# Patient Record
Sex: FEMALE | Race: WHITE | Employment: FULL TIME | URBAN - METROPOLITAN AREA
[De-identification: names, ages, dates, MRNs, and addresses within clinical notes are randomized per-mention and may not be internally consistent; named-entity substitution may affect disease eponyms.]

---

## 2024-03-21 ENCOUNTER — TELEPHONE (OUTPATIENT)
Age: 44
End: 2024-03-21

## 2024-05-06 ENCOUNTER — OFFICE VISIT (OUTPATIENT)
Dept: ENDOCRINOLOGY | Facility: HOSPITAL | Age: 44
End: 2024-05-06
Payer: COMMERCIAL

## 2024-05-06 VITALS
WEIGHT: 187.2 LBS | DIASTOLIC BLOOD PRESSURE: 70 MMHG | SYSTOLIC BLOOD PRESSURE: 104 MMHG | HEART RATE: 120 BPM | HEIGHT: 64 IN | BODY MASS INDEX: 31.96 KG/M2

## 2024-05-06 DIAGNOSIS — R53.82 CHRONIC FATIGUE: Primary | ICD-10-CM

## 2024-05-06 DIAGNOSIS — Z83.49 FAMILY HISTORY OF THYROID DISEASE: ICD-10-CM

## 2024-05-06 DIAGNOSIS — E83.52 HIGH CALCIUM LEVELS: ICD-10-CM

## 2024-05-06 DIAGNOSIS — L65.9 HAIR LOSS: ICD-10-CM

## 2024-05-06 DIAGNOSIS — Z90.89 STATUS POST PARATHYROIDECTOMY: ICD-10-CM

## 2024-05-06 DIAGNOSIS — Z98.890 STATUS POST PARATHYROIDECTOMY: ICD-10-CM

## 2024-05-06 DIAGNOSIS — Z86.39 HISTORY OF PARATHYROID DISEASE: ICD-10-CM

## 2024-05-06 DIAGNOSIS — Z83.2 FAMILY HISTORY OF AUTOIMMUNE DISORDER: ICD-10-CM

## 2024-05-06 PROCEDURE — 99205 OFFICE O/P NEW HI 60 MIN: CPT | Performed by: INTERNAL MEDICINE

## 2024-05-06 RX ORDER — AZELASTINE HYDROCHLORIDE 137 UG/1
SPRAY, METERED NASAL
COMMUNITY

## 2024-05-06 RX ORDER — ATOMOXETINE 80 MG/1
80 CAPSULE ORAL DAILY
COMMUNITY

## 2024-05-06 RX ORDER — FERROUS SULFATE 325(65) MG
325 TABLET ORAL
COMMUNITY

## 2024-05-06 RX ORDER — ALPRAZOLAM 1 MG
1 TABLET ORAL AS NEEDED
COMMUNITY

## 2024-05-06 RX ORDER — LOSARTAN POTASSIUM 50 MG/1
50 TABLET ORAL DAILY
COMMUNITY

## 2024-05-06 RX ORDER — HYDROCORTISONE ACETATE 25 MG/1
SUPPOSITORY RECTAL
COMMUNITY
Start: 2024-03-14

## 2024-05-06 RX ORDER — LAMOTRIGINE 25 MG/1
50 TABLET ORAL DAILY
COMMUNITY

## 2024-05-06 NOTE — PROGRESS NOTES
5/6/2024    Assessment/Plan      Diagnoses and all orders for this visit:    Chronic fatigue  -     T4, free; Future  -     Thyroid Peroxidase and Thyroglobulin Antibodies; Future  -     TSH, 3rd generation; Future  -     Vitamin D 25 hydroxy  -     T3, free  -     Comprehensive metabolic panel; Future  -     CBC and differential  -     Ferritin  -     Magnesium  -     Phosphorus  -     PTH, intact  -     Calcium, ionized; Future  -     NILSA SCR, IFA W/REFL TITER/ PATTERN/MCT PNL 1; Future  -     Cortisol Level,7-9 AM Specimen    High calcium levels  -     T4, free; Future  -     Thyroid Peroxidase and Thyroglobulin Antibodies; Future  -     TSH, 3rd generation; Future  -     Vitamin D 25 hydroxy  -     T3, free  -     Comprehensive metabolic panel; Future  -     CBC and differential  -     Ferritin  -     Magnesium  -     Phosphorus  -     PTH, intact  -     Calcium, ionized; Future  -     NILSA SCR, IFA W/REFL TITER/ PATTERN/MCT PNL 1; Future  -     Cortisol Level,7-9 AM Specimen    History of parathyroid disease  -     T4, free; Future  -     Thyroid Peroxidase and Thyroglobulin Antibodies; Future  -     TSH, 3rd generation; Future  -     Vitamin D 25 hydroxy  -     T3, free  -     Comprehensive metabolic panel; Future  -     CBC and differential  -     Ferritin  -     Magnesium  -     Phosphorus  -     PTH, intact  -     Calcium, ionized; Future  -     NILSA SCR, IFA W/REFL TITER/ PATTERN/MCT PNL 1; Future  -     Cortisol Level,7-9 AM Specimen    Status post parathyroidectomy  -     T4, free; Future  -     Thyroid Peroxidase and Thyroglobulin Antibodies; Future  -     TSH, 3rd generation; Future  -     Vitamin D 25 hydroxy  -     T3, free  -     Comprehensive metabolic panel; Future  -     CBC and differential  -     Ferritin  -     Magnesium  -     Phosphorus  -     PTH, intact  -     Calcium, ionized; Future  -     NILSA SCR, IFA W/REFL TITER/ PATTERN/MCT PNL 1; Future  -     Cortisol Level,7-9 AM Specimen    Family  history of thyroid disease  -     T4, free; Future  -     Thyroid Peroxidase and Thyroglobulin Antibodies; Future  -     TSH, 3rd generation; Future  -     Vitamin D 25 hydroxy  -     T3, free  -     Comprehensive metabolic panel; Future  -     CBC and differential  -     Ferritin  -     Magnesium  -     Phosphorus  -     PTH, intact  -     Calcium, ionized; Future  -     NILSA SCR, IFA W/REFL TITER/ PATTERN/MCT PNL 1; Future  -     Cortisol Level,7-9 AM Specimen    Family history of autoimmune disorder  -     T4, free; Future  -     Thyroid Peroxidase and Thyroglobulin Antibodies; Future  -     TSH, 3rd generation; Future  -     Vitamin D 25 hydroxy  -     T3, free  -     Comprehensive metabolic panel; Future  -     CBC and differential  -     Ferritin  -     Magnesium  -     Phosphorus  -     PTH, intact  -     Calcium, ionized; Future  -     NILSA SCR, IFA W/REFL TITER/ PATTERN/MCT PNL 1; Future  -     Cortisol Level,7-9 AM Specimen    Hair loss  -     T4, free; Future  -     Thyroid Peroxidase and Thyroglobulin Antibodies; Future  -     TSH, 3rd generation; Future  -     Vitamin D 25 hydroxy  -     T3, free  -     Comprehensive metabolic panel; Future  -     CBC and differential  -     Ferritin  -     Magnesium  -     Phosphorus  -     PTH, intact  -     Calcium, ionized; Future  -     NILSA SCR, IFA W/REFL TITER/ PATTERN/MCT PNL 1; Future  -     Cortisol Level,7-9 AM Specimen    Other orders  -     Xanax 1 MG tablet; Take 1 mg by mouth as needed  -     atomoxetine (STRATTERA) 80 MG capsule; Take 80 mg by mouth daily  -     Azelastine HCl 137 MCG/SPRAY SOLN  -     Cetirizine HCl (ZYRTEC ALLERGY PO); Take 10 mg by mouth daily at bedtime  -     Cholecalciferol (VITAMIN D3 GUMMIES PO); in the morning  -     ferrous sulfate ([employer] Iron) 325 (65 Fe) mg tablet; Take 325 mg by mouth daily with breakfast  -     lamoTRIgine (LaMICtal) 25 mg tablet; Take 50 mg by mouth daily  -     losartan (COZAAR) 50 mg tablet; Take 50 mg by  mouth daily  -     Multiple Vitamins-Minerals (Multi Vitamin/Minerals) TABS; Take by mouth daily  -     hydrocortisone (ANUSOL-HC) 25 mg suppository; unwrap and insert 1 suppository rectally twice a day if needed for 14 days        Assessment/Plan:  1.  History of parathyroid disease post right parathyroidectomy.  She did have a mildly elevated ionized calcium in the hospital but calcium and parathyroid hormone levels were normal.  I will repeat these levels now.  She will continue to drink plenty of water.    2.  Chronic fatigue with hair loss.  I will check iron and vitamin D levels.    3.  Family history of thyroid disease.  She has multiple family members with Hashimoto's thyroiditis.  I will check thyroid levels with thyroid antibodies.    4.  Family history of autoimmune disease.  Given all her family history of various autoimmune diseases I will check an NILSA and also check an a.m. cortisol.    Blood work to be done will be drawn fasting between 7 and 9 AM consisting of a.m. cortisol, NILSA, ionized calcium, CMP, magnesium, phosphorus, intact parathyroid hormone level, 25-hydroxy vitamin D level, CBC, ferritin level, TSH, free T4, free T3, thyroid peroxidase antibodies and antithyroglobulin antibodies.    Follow-up will be determined based on the blood work.    I have spent a total time of 60 minutes on 05/06/24 in caring for this patient including Diagnostic results, Prognosis, Risks and benefits of tx options, Instructions for management, Patient and family education, Importance of tx compliance, Risk factor reductions, Impressions, Counseling / Coordination of care, Documenting in the medical record, Reviewing / ordering tests, medicine, procedures  , and Obtaining or reviewing history  .        CC: Parathyroid, thyroid consult    History of Present Illness     HPI: Latesha Hameed is a 44 y.o. year old female with history of primary hyperparathyroidism post parathyroid surgery who recently had a mildly elevated  ionized calcium and has a lot of other symptomatology for which she is worried about parathyroid or thyroid disease.    She apparently had a parathyroid adenoma on an ultrasound in June 2016 and then underwent right parathyroidectomy in 2017 with no sequelae afterwards and reportedly calcium was normal at the time but parathyroid was very high around 179.  She had normalization of her studies.  Recently, she had unusual involuntary movements and was seen in the emergency room in March 2024 which showed a mildly elevated ionized calcium with normal parathyroid hormone level.  IV fluids normalize the calcium.  She was admitted to the hospital for several days and had some unusual convulsion like symptoms even with an EEG being performed that did not show anything.  She is seeing neurology.    She has no recent tetany or muscle spasms or cramps and the involuntary movements seem to be improved.  She has a history of an unusual knee type symptomatology for several years but this has been a while since she had that.  She admits to emotional lability with fatigue insomnia and brain fog.  Weight is stable.  She is more hot and sweaty.  She denies cold intolerance but will have palpitations and flutters which are worse over the last 6 months.  She denies tremors but has constipation.  She has no diarrhea.  She has hair loss mostly on the top and sides and some eyebrow thinning along with some hirsutism since childhood.  Skin is dry on her face a bit more and she can have cheeks get red.  She has dry brittle nails.  Menstrual cycles occur on a monthly basis but have some irregular timing.  She has no history of head or neck radiation in the past and no compressive thyroid symptoms or difficulties with swallowing.    She has no polyuria or polydipsia.  She has no nocturia.  She has some difficulties with myalgias and arthralgias.  She has had intermittent numbness and tingling of the fingers and feet and actually had a long  bunch of the numbness and tingling of the fingers along with the tip of her nose and lip and tongue on a different medication which has now been discontinued so the symptoms have improved.  She does of note have a history of renal stones and reportedly had a DEXA scan in 2016 that may have said osteoporosis but she is on no treatment other than vitamin D Gummies 2 daily which may be about 1000 units daily.  She is not on any calcium replacement.    She does have a strong family history of autoimmune disease with a mother with thyroid disease and type 1 diabetes, sister with Hashimoto's, psoriasis, and positive NILSA, niece with hypothyroidism and juvenile rheumatoid arthritis, and nephew with thyroid disease and [place]'s disease.    Review of Systems   Constitutional:  Positive for appetite change and fatigue. Negative for unexpected weight change.        Appetite diminished on Strattera.   HENT:  Negative for trouble swallowing.         No XRT to the head or neck in the past.   Eyes:  Negative for visual disturbance.        Wears glasses. No diplopia.    Respiratory:  Negative for chest tightness and shortness of breath.    Cardiovascular:  Positive for palpitations. Negative for chest pain.        Flutters and palpitations, worse over the last 6 months.    Gastrointestinal:  Positive for constipation and nausea. Negative for diarrhea.   Endocrine: Positive for heat intolerance. Negative for cold intolerance, polydipsia and polyuria.        Can be hot and can wake in sweats. No nocturia.    Genitourinary:         Menses on a monthly basis, timing irregular.    Musculoskeletal:  Positive for arthralgias, back pain and myalgias.        Regular chiropractor visits weekly as hips . Muscle always tense. Flat feet and uses inserts.    Skin:  Negative for rash.        Has hair loss mostly top and sides. Some eyebrow thinning. Hirsutism chin since  childhood. Occasional acne, worse as an adult, is a bit worse. Skin on face  more dry. Red skin on face. Cheeks can get red. Has dry and brittle nails.   Neurological:  Positive for headaches. Negative for tremors and numbness.        Involuntary muscle movements less since march 2024 hospitalization. No current numbness and tingling of the fingers, tip of nose and lip and tongue, on a another med and now gone.    Psychiatric/Behavioral:  Positive for confusion and sleep disturbance. The patient is nervous/anxious.         Wakes a lot. Has brain fog and emotional lability.        Historical Information   Past Medical History:   Diagnosis Date    ADHD     Anxiety     Depression     Hypertension     Idiopathic urticaria     as an adolescent    Insulin resistance     history prediabetes, was on metfromin, lost weight    Sleep apnea      Past Surgical History:   Procedure Laterality Date    PARATHYROID GLAND SURGERY      right     Social History   Social History     Substance and Sexual Activity   Alcohol Use Not Currently     Social History     Substance and Sexual Activity   Drug Use Never     Social History     Tobacco Use   Smoking Status Never   Smokeless Tobacco Never     Family History:   Family History   Problem Relation Age of Onset    Diabetes type I Mother     Hypothyroidism Mother     Hypertension Father     COPD Father     Hypothyroidism Sister         Hashimoto's    Psoriatic Arthritis Sister     Hashimoto's thyroiditis Sister     Rheumatologic disease Sister         NILSA, HLA B 27 positive    Hypertension Maternal Grandmother     Hypothyroidism Paternal Grandmother     Hypothyroidism Family         Niece; Hashimoto's    Hashimoto's thyroiditis Niece     Juvenile idiopathic arthritis Niece     Hashimoto's thyroiditis Nephew     [place]'s disease Nephew        Meds/Allergies   Current Outpatient Medications   Medication Sig Dispense Refill    atomoxetine (STRATTERA) 80 MG capsule Take 80 mg by mouth daily      Azelastine HCl 137 MCG/SPRAY SOLN       Cetirizine HCl (ZYRTEC ALLERGY PO)  "Take 10 mg by mouth daily at bedtime      Cholecalciferol (VITAMIN D3 GUMMIES PO) in the morning      ferrous sulfate ([employer] Iron) 325 (65 Fe) mg tablet Take 325 mg by mouth daily with breakfast      hydrocortisone (ANUSOL-HC) 25 mg suppository unwrap and insert 1 suppository rectally twice a day if needed for 14 days      lamoTRIgine (LaMICtal) 25 mg tablet Take 50 mg by mouth daily      losartan (COZAAR) 50 mg tablet Take 50 mg by mouth daily      Multiple Vitamins-Minerals (Multi Vitamin/Minerals) TABS Take by mouth daily      Xanax 1 MG tablet Take 1 mg by mouth as needed       No current facility-administered medications for this visit.     Allergies   Allergen Reactions    Raw Fruit - Food Allergy Itching, Lip Swelling and Tongue Swelling       Objective   Vitals: Blood pressure 104/70, pulse (!) 120, height 5' 4\" (1.626 m), weight 84.9 kg (187 lb 3.2 oz).  Invasive Devices       None                   Physical Exam  Vitals reviewed.   Constitutional:       Appearance: Normal appearance. She is well-developed.   HENT:      Head: Normocephalic and atraumatic.   Eyes:      Extraocular Movements: Extraocular movements intact.      Conjunctiva/sclera: Conjunctivae normal.      Comments: No lid lag, stare, proptosis, or periorbital edema.   Neck:      Thyroid: No thyromegaly.      Vascular: No carotid bruit.      Comments: Healed anterior neck scar.  Thyroid normal in size.  No palpable thyroid nodules.  No masses of the neck.  Cardiovascular:      Rate and Rhythm: Normal rate and regular rhythm.      Heart sounds: Normal heart sounds. No murmur heard.  Pulmonary:      Effort: Pulmonary effort is normal.      Breath sounds: Normal breath sounds. No wheezing.   Abdominal:      General: Bowel sounds are normal.      Palpations: Abdomen is soft.      Tenderness: There is no abdominal tenderness.   Musculoskeletal:         General: No deformity. Normal range of motion.      Cervical back: Normal range of motion and neck " supple.      Right lower leg: No edema.      Left lower leg: No edema.      Comments: No tremor of the outstretched hands.   Lymphadenopathy:      Cervical: No cervical adenopathy.   Skin:     General: Skin is warm and dry.      Findings: No rash.      Comments: No hyperpigmentation of the skin.   Neurological:      Mental Status: She is alert and oriented to person, place, and time.      Deep Tendon Reflexes: Reflexes are normal and symmetric.      Comments: Patellar deep tendon reflexes normal.         The history was obtained from the review of the chart and from the patient and sister.    Lab Results:      Blood work performed on 3/11/2024 on admission to the ED showed an ionized calcium of 1.34 with a total calcium of 10.1 in the setting of an albumin of 4.6.  Phosphorus was 3.1 with a magnesium of 2.1 and normal TSH.  Repeat blood work several hours later in the ED showed calcium of 9.8 with an albumin of 4.6 phosphorus of 3.7 and intact parathyroid hormone level of 31.    Lab Results   Component Value Date    CREATININE 1.0 03/13/2024    CREATININE 0.9 03/12/2024    CREATININE 0.9 03/11/2024    BUN 11 03/13/2024    K 4.0 03/13/2024     03/13/2024    CO2 23 03/13/2024     EGFR   Date Value Ref Range Status   03/13/2024 75 >=60 mL/min Final     Comment:     eGFR is calculated based on the CKD-EPI 2021 equation   02/02/2019 >60.0 >60 Final     Comment:     If patient is , multiply estimated GFR by 1.159.         Lab Results   Component Value Date    ALT 22 03/11/2024    AST 14 03/11/2024    ALKPHOS 68 03/11/2024       Lab Results   Component Value Date    TSH 2.68 03/11/2024             No future appointments.

## 2024-05-08 ENCOUNTER — TELEPHONE (OUTPATIENT)
Age: 44
End: 2024-05-08

## 2024-05-08 NOTE — TELEPHONE ENCOUNTER
Lab called- they had 3 tubes that they could've used for the patients comprehensive metabolic panel, and all 3 hemolyzed so they were unable to collect that lab. They collected all of the others though. Please advise if this should be redrawn.

## 2024-06-18 DIAGNOSIS — Z00.6 ENCOUNTER FOR EXAMINATION FOR NORMAL COMPARISON OR CONTROL IN CLINICAL RESEARCH PROGRAM: ICD-10-CM
